# Patient Record
(demographics unavailable — no encounter records)

---

## 2017-05-02 NOTE — EDM.PDOC
ED HPI PREGNANCY





- General


Chief Complaint: OB/GYN Problem


Stated Complaint: THINKS SHE IS IN LABOR


Time Seen by Provider: 17 17:33


Source of Information: Reports: Patient


History Limitations: Reports: No limitations





- History of Present Illness


INITIAL COMMENTS - FREE TEXT/NARRATIVE: 


Patient presents to ER with concerns that she may possibly be in labor.  Is 37 

weeks pregnant and has been having cramps all day.  Patient denies vaginal 

bleeding, discharge.  No urinary complaints.  Is scheduled for a  on 

May 15th.  States has not kept track of frequency of cramps, states "frequent".

  Patient does admit that she seems to have more cramping if she is doing more 

walking or is on her feet more through the day.  Patient has had an uneventful 

pregnancy.  Seen Dr. Maier last week.  


Symptom Onset Date: 17


Timing/Duration: Reports: Hour(s):


Location, Pregnancy: Reports: abdomen


Quality: Reports: other (cramping)


Severity: moderate


Associated Symptoms: Denies: vaginal bleeding, vaginal clots, vaginal discharge

, vaginal fluid





- Related Data


Allergies/ADRs: 


 Allergies











Allergy/AdvReac Type Severity Reaction Status Date / Time


 


codeine Allergy  Hives Verified 17 17:18











Home Meds: 


 Home Meds





PNV95/Ferrous Fumarate/FA [Prenatal Tablet] 1 each PO DAILY 17 [History]











Past Medical History





- Past Health History


Medical/Surgical History: Denies Medical/Surgical History





Social & Family History





- Family History


Family Medical History: Noncontributory





- Tobacco Use


Smoking Status *Q: Former Smoker


Years of Tobacco use: 1


Packs/Tins Daily: 1


Used Tobacco, but Quit: Yes


Month Tobacco Last Used: unknown





- Recreational Drug Use


Recreational Drug Use: No





ED ROS GENERAL





- Review of Systems


Review Of Systems: See Below


Constitutional: Denies: fever, chills, malaise, weakness, decreased appetite


HEENT: Reports: No symptoms


Respiratory: Reports: No Symptoms


Cardiovascular: Reports: No symptoms


GI/Abdominal: Reports: Other (abdominal cramps/?labor)


: Reports: no symptoms


Musculoskeletal: Reports: no symptoms


Skin: Reports: no symptoms


Neurological: Reports: No Symptoms


Psychiatric: Reports: No symptoms





ED EXAM PREGNANCY





- Physical Exam


Exam: See Below


Exam Limited By: No limitations


General Appearance: alert, WD/WN, no apparent distress


Head: normocephalic


Respiratory/Chest: no respiratory distress, lungs clear, normal breath sounds


Cardiovascular: regular rate, rhythm


GI/Abdominal: normal bowel sounds, soft


 (Female) Exam: Cervical dilatation (1 cm).  No: Vaginal bleeding, Vaginal 

discharge


Fetal heart tones: present


Fetal heart tones per min: 144


Fetal movement: active


Extremities: normal inspection, normal range of motion


Neurological: alert, oriented


Psychiatric: normal affect, normal mood


Skin Exam: Warm, Dry





Course





- Vital Signs


Last Recorded V/S: 


 Last Vital Signs











Temp  98.3 F   17 17:27


 


Pulse  97   17 17:27


 


Resp  18   17 17:27


 


BP  146/90 H  17 17:39


 


Pulse Ox  98   17 17:27














- Orders/Labs/Meds


Labs: 


 Laboratory Tests











  17 Range/Units





  17:58 


 


Urine Color  Yellow  (YELLOW)  


 


Urine Appearance  Clear  (CLEAR)  


 


Urine pH  5.5  (4.5-8.0)  


 


Ur Specific Gravity  1.002 L  (1.003-1.020)  


 


Urine Protein  Negative  (NEGATIVE)  mg/dL


 


Urine Glucose (UA)  Negative  (NEGATIVE)  mg/dL


 


Urine Ketones  Negative  (NEGATIVE)  mg/dL


 


Urine Occult Blood  Negative  (NEGATIVE)  


 


Urine Nitrite  Negative  (NEGATIVE)  


 


Urine Bilirubin  Negative  (NEGATIVE)  


 


Urine Urobilinogen  0.2  (0.2-1.0)  EU/dL


 


Ur Leukocyte Esterase  Negative  (NEGATIVE)  


 


Urine RBC  Not seen  (0-5)  /HPF


 


Urine WBC  Not seen  (0-5)  /HPF











Meds: 


Medications














Discontinued Medications














Generic Name Dose Route Start Last Admin





  Trade Name Boby  PRN Reason Stop Dose Admin


 


Acetaminophen  650 mg  17 17:57  17 18:16





  Tylenol  PO  17 17:58  650 mg





  NOW ONE   Administration


 


Lactated Ringer's  1,000 mls @ 999 mls/hr  17 17:57  17 18:12





  Ringers, Lactated  IV  17 18:57  999 mls/hr





  .BOLUS ONE   Administration














- Re-Assessments/Exams


Free Text/Narrative Re-Assessment/Exam: 





17 18:04


Did contact [facility].  Instructed to give IV fluids, tylenol and continue 

to monitor contractions.  Recheck cervix and if no changes or progression of 

labor, may be discharged home to rest.


17 21:05


Vaginal exam shows thick cervix, 1 cm, no change from previous exam.  Is still 

having mild contractions, have lessened in severity since on arrival. 





Departure





- Departure


Time of Disposition: 21:17


Disposition: Home, Self-Care 01


Condition: good


Clinical Impression: 


  contractions





Forms:  ED Department Discharge


Additional Instructions: 


1.  Rest


2.  Push fluids


3.  Tylenol as needed for discomfort


4.  If continue to have labor pain, see OB in [place]

## 2017-07-30 NOTE — EDM.PDOC
ED HPI GENERAL MEDICAL PROBLEM





- General


Chief Complaint: General


Stated Complaint: Skin complaint


Time Seen by Provider: 17 18:04


Source of Information: Reports: Patient


History Limitations: Reports: No Limitations





- History of Present Illness


INITIAL COMMENTS - FREE TEXT/NARRATIVE: 





This patient is a 25 year old female that presents to the ER. Patient reports 

that 1 week ago she noticed a painful, ball under her skin in the left lower 

abdomen. She reports over the last 1 week it has grown in size. She reports 

that she had a  done in May without complication. This has healed well 

and closed. The patient denies ha, dizziness, n, v, d, f, cp, soa, urinary/

bowel changes. 


Duration: Week(s): (1)


Location: Reports: Abdomen


Quality: Reports: Ache


Severity: Mild


Improves with: Reports: None


Worsens with: Reports: None


Associated Symptoms: Reports: No Other Symptoms.  Denies: Confusion, Chest Pain

, Cough, cough w sputum, Diaphoresis, Fever/Chills, Headaches, Loss of Appetite

, Malaise, Nausea/Vomiting, Rash, Seizure, Shortness of Breath, Syncope, 

Weakness


  ** Left Lower Abdomen


Pain Score (Numeric/FACES): 6





- Related Data


 Allergies











Allergy/AdvReac Type Severity Reaction Status Date / Time


 


codeine Allergy  Hives Verified 17 18:23











Home Meds: 


 Home Meds





. [No Known Home Meds]  17 [History]











Past Medical History





- Past Health History


Medical/Surgical History: Denies Medical/Surgical History


Cardiovascular History: Reports: Hypertension


OB/GYN History: Reports: Pregnancy, Other (See Below)


Other OB/BYN History: 1 c section


Psychiatric History: Reports: Depression


Endocrine/Metabolic History: Reports: Obesity/BMI 30+





- Past Surgical History


HEENT Surgical History: Reports: Tonsillectomy


Musculoskeletal Surgical History: Reports: Other (See Below)





Social & Family History





- Family History


Family Medical History: Noncontributory





- Tobacco Use


Smoking Status *Q: Light Tobacco Smoker


Years of Tobacco use: 15


Packs/Tins Daily: 0.5


Used Tobacco, but Quit: Yes


Month Tobacco Last Used: unknown





- Caffeine Use


Caffeine Use: Reports: Soda





- Recreational Drug Use


Recreational Drug Use: No





ED ROS GENERAL





- Review of Systems


Review Of Systems: See Below


Constitutional: Reports: No Symptoms


HEENT: Reports: No Symptoms


Respiratory: Reports: No Symptoms


Cardiovascular: Reports: No Symptoms


Endocrine: Reports: No Symptoms


GI/Abdominal: Reports: Other (abdominal mass LLQ. Painful. ).  Denies: Nausea, 

Vomiting


: Reports: No Symptoms


Musculoskeletal: Reports: No Symptoms


Skin: Reports: No Symptoms


Neurological: Reports: No Symptoms


Psychiatric: Reports: No Symptoms


Hematologic/Lymphatic: Reports: No Symptoms


Immunologic: Reports: No Symptoms





ED EXAM, GENERAL





- Physical Exam


Exam: See Below


Exam Limited By: No Limitations


General Appearance: Alert, WD/WN, No Apparent Distress


Eye Exam: Bilateral Eye: PERRL


Ears: Normal External Exam, Normal Canal, Hearing Grossly Normal, Normal TMs


Ear Exam: Bilateral Ear: Auricle Normal, Canal Normal, TM normal


Nose: Normal Inspection, Normal Mucosa, No Blood


Throat/Mouth: Normal Inspection, Normal Lips, Normal Teeth, Normal Gums, Normal 

Oropharynx, Normal Voice, No Airway Compromise


Head: Atraumatic, Normocephalic


Neck: Normal Inspection, Supple, Non-Tender, Full Range of Motion


Respiratory/Chest: No Respiratory Distress, Lungs Clear, Normal Breath Sounds, 

No Accessory Muscle Use


Cardiovascular: Normal Peripheral Pulses, Regular Rate, Rhythm, No Edema, No 

Gallop, No JVD, No Murmur, No Rub


Peripheral Pulses: 2+: Radial (L), Radial (R), Posterior Tibial (L), Posterior 

Tibial (R)


GI/Abdominal: Normal Bowel Sounds, Soft, No Organomegaly, No Distention, No 

Abnormal Bruit, No Mass, Pelvis Stable, Mass (LLQ size of golfball. Tender. 

Moveable. )


Back Exam: Normal Inspection, Full Range of Motion.  No: CVA Tenderness (L), 

CVA Tenderness (R)


Extremities: Normal Inspection, Normal Range of Motion, Non-Tender, No Pedal 

Edema, Normal Capillary Refill


Neurological: Alert, Oriented, Normal Cognition, Normal Gait, No Motor/Sensory 

Deficits


Psychiatric: Normal Affect, Normal Mood


Skin Exam: Warm, Dry, Intact, Normal Color, No Rash


Lymphatic: No Adenopathy





Course





- Vital Signs


Last Recorded V/S: 


 Last Vital Signs











Temp  97.7 F   17 17:59


 


Pulse  91   17 17:59


 


Resp  20   17 17:59


 


BP  146/93 H  17 17:59


 


Pulse Ox  95   17 17:59














- Orders/Labs/Meds


Meds: 


Medications














Discontinued Medications














Generic Name Dose Route Start Last Admin





  Trade Name Boby  PRN Reason Stop Dose Admin


 


Cephalexin  1 packet  17 18:23  





  Take Home: Cephalexin 500 Mg, 4 Cap Pack  PO  17 18:24  





  ONETIME ONE   














- Re-Assessments/Exams


Free Text/Narrative Re-Assessment/Exam: 





17 18:53


The patient has no vomiting, fever, or external visualized abscess. She does 

not appear ill or septic. To eliminate radiation and the best diagnostic test 

can be done in about 12 hours on an out patient basis. Therefore, discussed 

with patient and she agrees will do an Ultrasound tomorrow morning then will be 

seen in clinic following her US for further treatment plan. She is educated to 

return for increase in pain, increase in size, fever, vomiting, or any other 

concerns.








Departure





- Departure


Time of Disposition: 18:23


Disposition: Home, Self-Care 01


Condition: Good


Clinical Impression: 


 Abdominal mass, left lower quadrant








- Discharge Information


Instructions:  Abscess


Referrals: 


PCP,None [Primary Care Provider] - 


Forms:  ED Department Discharge


Additional Instructions: 


Have an ultrasound done here tomorrow: Scheduled time is 10:30am, please arrive 

early


May be seen by any provider in clinic tomorrow following your Ultrasound


Return to the ER for worsening of condition or any emergent concerns such as 

vomiting, fever, increase in pain, increase in swelling


Warm compresses to the area


Keflex 500mg 1 pill four times a day for 7 days #24 no refill: Given 4 in ER. 


Tylenol for pain 








- Assessment/Plan


Plan: 





PLEASE SEE RN NOTE FOR PFSH.

## 2018-05-04 NOTE — EDM.PDOC
ED HPI GENERAL MEDICAL PROBLEM





- General


Chief Complaint: OB/GYN Problem


Stated Complaint: ob


Time Seen by Provider: 18 08:39


Source of Information: Reports: Patient


History Limitations: Reports: No Limitations





- History of Present Illness


INITIAL COMMENTS - FREE TEXT/NARRATIVE: 





Lashae is a 26 year old  female, with no significant PMH, who 

presents to the clinic with c/o pelvic and back pain. She reports that for the 

past two weeks she has had lower mid back pain. Denies any injury to her back 

recently. She then reports that at about 3 am this morning she started having 

severe lower abdominal/pelvic pain. She describes the pain as sharp and 

cramping. She does report that she is pregnant. She is unsure about dates, but 

thinks shes "about a month along." She reports that she has a 1 year old and 

she has been breastfeeding, so she hasn't been having regular periods. She does 

believe she had a regular period in January, although she is unsure of dates. 

She does reports that throughout February she did spot some. She then reports 

that last month she took a pregnancy test, which was positive. Her PCP is Dr. Maier. She reports that she has an appointment scheduled for May 18th with 

her. 





She denies any N/V/D, constipation, vaginal discharge, itching or bleeding, 

chest pain, shortness of breath, N/T. She reports that other than the pain she 

has no complaints. 


Onset: Today, Sudden


Onset Date: 18


Onset Time: 03:00


Duration: Constant


Location: Reports: Back, Pelvis


Quality: Reports: Sharp, Other (cramping)


Severity: Severe


Improves with: Reports: None


Associated Symptoms: Denies: Confusion, Chest Pain, Cough, cough w sputum, 

Diaphoresis, Fever/Chills, Headaches, Loss of Appetite, Malaise, Nausea/Vomiting

, Rash, Seizure, Shortness of Breath, Syncope, Weakness


  ** Lower Abdomen


Pain Score (Numeric/FACES): 8





- Related Data


 Allergies











Allergy/AdvReac Type Severity Reaction Status Date / Time


 


codeine Allergy  Hives Verified 18 08:26











Home Meds: 


 Home Meds





. [No Known Home Meds]  17 [History]











Past Medical History





- Past Health History


Medical/Surgical History: Denies Medical/Surgical History


Cardiovascular History: Reports: Hypertension


OB/GYN History: Reports: Pregnancy, Other (See Below)


Other OB/BYN History: 2 c section


Psychiatric History: Reports: Depression


Endocrine/Metabolic History: Reports: Obesity/BMI 30+





- Past Surgical History


HEENT Surgical History: Reports: Tonsillectomy


Musculoskeletal Surgical History: Reports: Other (See Below)


Other Musculoskeletal Surgeries/Procedures:: knee surgery





Social & Family History





- Family History


Family Medical History: Noncontributory





- Tobacco Use


Smoking Status *Q: Current Every Day Smoker


Years of Tobacco use: 6


Packs/Tins Daily: 0.8


Used Tobacco, but Quit: Yes


Month/Year Tobacco Last Used: unknown





- Caffeine Use


Caffeine Use: Reports: Coffee





- Recreational Drug Use


Recreational Drug Use: No





ED ROS GENERAL





- Review of Systems


Review Of Systems: See Below


Constitutional: Denies: Fever, Chills, Malaise, Weakness, Fatigue, Decreased 

Appetite


Respiratory: Reports: No Symptoms.  Denies: Shortness of Breath, Wheezing, Cough


Cardiovascular: Reports: No Symptoms.  Denies: Chest Pain, Dyspnea on Exertion, 

Edema, Lightheadedness


Endocrine: Reports: No Symptoms


GI/Abdominal: Reports: Abdominal Pain (lower).  Denies: Constipation, Diarrhea, 

Decreased Appetite, Nausea, Vomiting


: Reports: No Symptoms.  Denies: Dysuria, Frequency, Urgency


Musculoskeletal: Reports: Back Pain (lower)


Skin: Reports: No Symptoms


Neurological: Denies: Confusion, Headache, Numbness, Tingling, Weakness


Psychiatric: Reports: No Symptoms


Hematologic/Lymphatic: Reports: No Symptoms


Immunologic: Reports: No Symptoms





ED EXAM PREGNANCY





- Physical Exam


Exam: See Below


Exam Limited By: No Limitations


General Appearance: Alert, WD/WN, Mild Distress


Neck: Normal Inspection, Supple, Non-Tender, Full Range of Motion


Respiratory/Chest: No Respiratory Distress, Lungs Clear, Normal Breath Sounds, 

No Accessory Muscle Use, Chest Non-Tender


Cardiovascular: Normal Peripheral Pulses, Regular Rate, Rhythm, No Edema, No 

Gallop, No JVD, No Murmur, No Rub


GI/Abdominal Exam: Normal Bowel Sounds, Soft, No Organomegaly, No Distention, 

No Abnormal Bruit, No Mass, Pelvis Stable, Tender (lower quadrants bilaterally)

.  No: Distended, Guarding, Rigid, Rebound


Fundal Height In cm: 13


 (Female) Exam: Enlarged Uterus


Fetal Heart Tones: Present


Fetal Heart Tones per Min: 165


Fetal Movement: Not Appreciated


Back Exam: Normal Inspection, Decreased Range of Motion.  No: CVA Tenderness (L)

, CVA Tenderness (R), Vertebral Tenderness


Extremities: Normal Inspection, Normal Range of Motion, Non-Tender, Normal 

Capillary Refill, No Pedal Edema


Neurological: Alert, Oriented, CN II-XII Intact, Normal Cognition, Normal Gait, 

Normal Reflexes, No Motor/Sensory Deficits


Psychiatric: Tearful


Skin Exam: Warm, Dry, Intact, Normal Color, No Rash


Lymphatic: No Adenopathy





Course





- Vital Signs


Last Recorded V/S: 


 Last Vital Signs











Temp  97.8 F   18 08:24


 


Pulse  130 H  18 08:24


 


Resp  20   18 08:24


 


BP  147/56 H  18 08:24


 


Pulse Ox  97   18 08:24














- Orders/Labs/Meds


Orders: 


 Active Orders 24 hr











 Category Date Time Status


 


 OB Ltd 1 or More Fetus [US] Stat Exams  18 08:51 Taken


 


 HCG QUALITATIVE,URINE [URCHEM] Stat Lab  18 08:33 Ordered


 


 QUANTITATIVE BHCG [REF] Stat Lab  18 08:41 Received


 


 UA W/MICROSCOPIC [URIN] Stat Lab  18 08:32 Ordered











Labs: 


 Laboratory Tests











  18 Range/Units





  08:32 08:32 08:32 


 


WBC    8.6  (5.0-10.0)  10^3/uL


 


RBC    4.36  (4.00-5.50)  10^6/uL


 


Hgb    11.8 L  (12.0-16.0)  g/dL


 


Hct    36.2 L  (37.0-47.0)  %


 


MCV    83.0  (82.0-94.0)  fL


 


MCH    27.1  (27.0-32.0)  pg


 


MCHC    32.6 L  (33.0-38.0)  g/dL


 


RDW Coeff of Jf    15.3 H  (11.0-15.0)  %


 


Plt Count    166  (150-400)  10^3/uL


 


Neut % (Auto)    65.3  (35-85)  %


 


Lymph % (Auto)    26.2  (10-55)  %


 


Mono % (Auto)    7.1  (0-16)  %


 


Eos % (Auto)    1.3  (0-5)  %


 


Baso % (Auto)    0.1  (0-3)  %


 


Neut # (Auto)    5.61  (1.80-7.00)  10^3/uL


 


Lymph # (Auto)    2.25  (1.00-4.80)  10^3/uL


 


Mono # (Auto)    0.61  (0.00-0.80)  10^3/uL


 


Eos # (Auto)    0.11  (0.00-0.45)  10^3/uL


 


Baso # (Auto)    0.01  10^3/uL


 


Sodium  136    (136-145)  mEq/L


 


Potassium  3.7    (3.5-5.0)  mEq/L


 


Chloride  104    ()  mEq/L


 


Carbon Dioxide  20 L    (21-32)  mmol/L


 


BUN  7    (7-18)  mg/dL


 


Creatinine  0.6    (0.6-1.0)  mg/dL


 


Est Cr Clr Drug Dosing  112.38    mL/min


 


Estimated GFR (MDRD)  > 60    (>=60)  mL/min


 


Glucose  98    (75-99)  mg/dL


 


Calcium  7.9 L    (8.4-10.1)  mg/dL


 


Total Bilirubin  0.3    (0.0-1.0)  mg/dL


 


AST  12 L    (15-37)  U/L


 


ALT  20    (12-78)  U/L


 


Alkaline Phosphatase  79    ()  U/L


 


C-Reactive Protein  1.9 H    (0.2-0.8)  mg/dL


 


Total Protein  6.8    (6.4-8.2)  g/dL


 


Albumin  3.2 L    (3.4-5.0)  g/dL


 


Urine Color   Yellow   (YELLOW)  


 


Urine Appearance   Clear   (CLEAR)  


 


Urine pH   7.0   (4.5-8.0)  


 


Ur Specific Gravity   1.020   (1.003-1.020)  


 


Urine Protein   Negative   (NEGATIVE)  mg/dL


 


Urine Glucose (UA)   Negative   (NEGATIVE)  mg/dL


 


Urine Ketones   Negative   (NEGATIVE)  mg/dL


 


Urine Occult Blood   Negative   (NEGATIVE)  


 


Urine Nitrite   Negative   (NEGATIVE)  


 


Urine Bilirubin   Negative   (NEGATIVE)  


 


Urine Urobilinogen   0.2   (0.2-1.0)  EU/dL


 


Ur Leukocyte Esterase   Negative   (NEGATIVE)  


 


Urine RBC   Not seen   (0-5)  /HPF


 


Urine WBC   Not seen   (0-5)  /HPF


 


Ur Squamous Epith Cells   Few H   (NOT SEEN)  /HPF


 


Urine Mucus   Occasional H   (NOT SEEN)  /HPF


 


Urine HCG, Qual     














  18 Range/Units





  08:33 


 


WBC   (5.0-10.0)  10^3/uL


 


RBC   (4.00-5.50)  10^6/uL


 


Hgb   (12.0-16.0)  g/dL


 


Hct   (37.0-47.0)  %


 


MCV   (82.0-94.0)  fL


 


MCH   (27.0-32.0)  pg


 


MCHC   (33.0-38.0)  g/dL


 


RDW Coeff of Jf   (11.0-15.0)  %


 


Plt Count   (150-400)  10^3/uL


 


Neut % (Auto)   (35-85)  %


 


Lymph % (Auto)   (10-55)  %


 


Mono % (Auto)   (0-16)  %


 


Eos % (Auto)   (0-5)  %


 


Baso % (Auto)   (0-3)  %


 


Neut # (Auto)   (1.80-7.00)  10^3/uL


 


Lymph # (Auto)   (1.00-4.80)  10^3/uL


 


Mono # (Auto)   (0.00-0.80)  10^3/uL


 


Eos # (Auto)   (0.00-0.45)  10^3/uL


 


Baso # (Auto)   10^3/uL


 


Sodium   (136-145)  mEq/L


 


Potassium   (3.5-5.0)  mEq/L


 


Chloride   ()  mEq/L


 


Carbon Dioxide   (21-32)  mmol/L


 


BUN   (7-18)  mg/dL


 


Creatinine   (0.6-1.0)  mg/dL


 


Est Cr Clr Drug Dosing   mL/min


 


Estimated GFR (MDRD)   (>=60)  mL/min


 


Glucose   (75-99)  mg/dL


 


Calcium   (8.4-10.1)  mg/dL


 


Total Bilirubin   (0.0-1.0)  mg/dL


 


AST   (15-37)  U/L


 


ALT   (12-78)  U/L


 


Alkaline Phosphatase   ()  U/L


 


C-Reactive Protein   (0.2-0.8)  mg/dL


 


Total Protein   (6.4-8.2)  g/dL


 


Albumin   (3.4-5.0)  g/dL


 


Urine Color   (YELLOW)  


 


Urine Appearance   (CLEAR)  


 


Urine pH   (4.5-8.0)  


 


Ur Specific Gravity   (1.003-1.020)  


 


Urine Protein   (NEGATIVE)  mg/dL


 


Urine Glucose (UA)   (NEGATIVE)  mg/dL


 


Urine Ketones   (NEGATIVE)  mg/dL


 


Urine Occult Blood   (NEGATIVE)  


 


Urine Nitrite   (NEGATIVE)  


 


Urine Bilirubin   (NEGATIVE)  


 


Urine Urobilinogen   (0.2-1.0)  EU/dL


 


Ur Leukocyte Esterase   (NEGATIVE)  


 


Urine RBC   (0-5)  /HPF


 


Urine WBC   (0-5)  /HPF


 


Ur Squamous Epith Cells   (NOT SEEN)  /HPF


 


Urine Mucus   (NOT SEEN)  /HPF


 


Urine HCG, Qual  Positive  











Meds: 


Medications














Discontinued Medications














Generic Name Dose Route Start Last Admin





  Trade Name Freq  PRN Reason Stop Dose Admin


 


Acetaminophen  650 mg  18 08:51  18 08:57





  Tylenol  PO  18 08:52  650 mg





  NOW ONE   Administration





     





     





     





     


 


Hydrocodone Bitart/Acetaminophen  1 tab  18 09:57  18 10:02





  Norco 325-5 Mg  PO  18 09:58  1 tab





  ONETIME ONE   Administration





     





     





     





     














- Re-Assessments/Exams


Free Text/Narrative Re-Assessment/Exam: 





18 09:17


Labs all stable. OB limited US completed. Unable to visualize ovaries but does 

show viable IUP at 14 2/7 weeks gestation. US tech recommends pushing fluids 

and re-scan to visualize ovaries in ~ 1 hour. Will bring patient down to [facility] [place], [place] 

for extended ER until able to re ultrasound. Discussed with patient and family, 

who are agreeable with plan. 





US repeated. Ovaries visualized. No concerns or etiology of pain. 





Discussed with Dr. Maier, patient's PCP. I will discharge her home with some 

pain medications. I will also refer her to physical therapy. 








Departure





- Departure


Time of Disposition: 13:14


Disposition: Home, Self-Care 01


Condition: Good


Clinical Impression: 


 Second trimester pregnancy





Low back pain


Qualifiers:


 Chronicity: acute Back pain laterality: midline Sciatica presence: without 

sciatica Qualified Code(s): M54.5 - Low back pain








- Discharge Information


Instructions:  Back Pain in Pregnancy, Pain Medicine Instructions, Easy-to-Read


Referrals: 


Angelica Maier MD [Primary Care Provider] - 


Forms:  ED Department Discharge


Additional Instructions: 


1) NORCO AS NEEDED FOR SEVERE PAIN. TRY TYLENOL FIRST.





2) ALTERNATE ICE AND HEAT AS NEEDED FOR COMFORT





3) REFERRAL FOR PHYSICAL THERAPY





4) PUSH FLUIDS





5) START TAKING PRENATAL VITAMIN DAILY





6) AVOID ALCOHOL, DRUGS, SMOKING, AND NSAIDS (IBUPROFEN) 





7) US SHOWS VIABLE PREGNANCY AT 14 2/7 WEEKS GESTATION. FOLLOW UP AS SCHEDULED 

WITH DR. MAIER MAY 18TH FOR INITIAL OB APT SOONER IF 





   SYMPTOMS WORSEN OR DO NOT IMPROVE 





- My Orders


Last 24 Hours: 


My Active Orders





18 08:32


UA W/MICROSCOPIC [URIN] Stat 





18 08:33


HCG QUALITATIVE,URINE [URCHEM] Stat 





18 08:41


QUANTITATIVE BHCG [REF] Stat 





18 08:51


OB Ltd 1 or More Fetus [US] Stat 














- Assessment/Plan


Last 24 Hours: 


My Active Orders





18 08:32


UA W/MICROSCOPIC [URIN] Stat 





18 08:33


HCG QUALITATIVE,URINE [URCHEM] Stat 





18 08:41


QUANTITATIVE BHCG [REF] Stat 





18 08:51


OB Ltd 1 or More Fetus [US] Stat